# Patient Record
Sex: MALE | Race: WHITE | NOT HISPANIC OR LATINO | Employment: FULL TIME | URBAN - NONMETROPOLITAN AREA
[De-identification: names, ages, dates, MRNs, and addresses within clinical notes are randomized per-mention and may not be internally consistent; named-entity substitution may affect disease eponyms.]

---

## 2023-06-09 PROBLEM — K21.9 GERD (GASTROESOPHAGEAL REFLUX DISEASE): Status: ACTIVE | Noted: 2023-06-09

## 2023-06-09 PROBLEM — I10 HTN (HYPERTENSION): Status: ACTIVE | Noted: 2023-06-09

## 2023-06-09 RX ORDER — AMLODIPINE BESYLATE 5 MG/1
TABLET ORAL
COMMUNITY
Start: 2022-05-28 | End: 2023-06-26 | Stop reason: ALTCHOICE

## 2023-06-09 RX ORDER — HYDROCODONE BITARTRATE AND ACETAMINOPHEN 5; 325 MG/1; MG/1
TABLET ORAL EVERY 8 HOURS
COMMUNITY
Start: 2015-08-26 | End: 2023-06-26 | Stop reason: ALTCHOICE

## 2023-06-09 RX ORDER — IBUPROFEN 800 MG/1
TABLET ORAL 3 TIMES DAILY PRN
COMMUNITY
End: 2023-06-26 | Stop reason: ALTCHOICE

## 2023-06-09 RX ORDER — CETIRIZINE HYDROCHLORIDE 10 MG/1
1 TABLET ORAL DAILY
COMMUNITY
Start: 2021-12-11 | End: 2023-06-26 | Stop reason: ALTCHOICE

## 2023-06-09 RX ORDER — PANTOPRAZOLE SODIUM 40 MG/1
1 TABLET, DELAYED RELEASE ORAL DAILY
COMMUNITY
Start: 2022-05-26 | End: 2023-06-26 | Stop reason: ALTCHOICE

## 2023-06-26 ENCOUNTER — OFFICE VISIT (OUTPATIENT)
Dept: PRIMARY CARE | Facility: CLINIC | Age: 23
End: 2023-06-26
Payer: COMMERCIAL

## 2023-06-26 VITALS
HEART RATE: 75 BPM | HEIGHT: 68 IN | BODY MASS INDEX: 27.58 KG/M2 | WEIGHT: 182 LBS | DIASTOLIC BLOOD PRESSURE: 62 MMHG | OXYGEN SATURATION: 97 % | SYSTOLIC BLOOD PRESSURE: 110 MMHG | TEMPERATURE: 97.3 F

## 2023-06-26 DIAGNOSIS — L72.3 SEBACEOUS CYST: ICD-10-CM

## 2023-06-26 DIAGNOSIS — Z00.00 ANNUAL PHYSICAL EXAM: Primary | ICD-10-CM

## 2023-06-26 PROCEDURE — 3078F DIAST BP <80 MM HG: CPT | Performed by: INTERNAL MEDICINE

## 2023-06-26 PROCEDURE — 3074F SYST BP LT 130 MM HG: CPT | Performed by: INTERNAL MEDICINE

## 2023-06-26 PROCEDURE — 99213 OFFICE O/P EST LOW 20 MIN: CPT | Performed by: INTERNAL MEDICINE

## 2023-06-26 PROCEDURE — 99395 PREV VISIT EST AGE 18-39: CPT | Performed by: INTERNAL MEDICINE

## 2023-06-26 PROCEDURE — 1036F TOBACCO NON-USER: CPT | Performed by: INTERNAL MEDICINE

## 2023-06-26 ASSESSMENT — ENCOUNTER SYMPTOMS
ABDOMINAL PAIN: 0
SINUS PAIN: 0
ARTHRALGIAS: 0
DIARRHEA: 0
FATIGUE: 0
OCCASIONAL FEELINGS OF UNSTEADINESS: 0
BLOOD IN STOOL: 0
BRUISES/BLEEDS EASILY: 0
FEVER: 0
COUGH: 0
PALPITATIONS: 0
SORE THROAT: 0
WOUND: 1
DIFFICULTY URINATING: 0
DIZZINESS: 0
HEADACHES: 0
WHEEZING: 0
UNEXPECTED WEIGHT CHANGE: 0
DEPRESSION: 0
LOSS OF SENSATION IN FEET: 0

## 2023-06-26 ASSESSMENT — COLUMBIA-SUICIDE SEVERITY RATING SCALE - C-SSRS
6. HAVE YOU EVER DONE ANYTHING, STARTED TO DO ANYTHING, OR PREPARED TO DO ANYTHING TO END YOUR LIFE?: NO
1. IN THE PAST MONTH, HAVE YOU WISHED YOU WERE DEAD OR WISHED YOU COULD GO TO SLEEP AND NOT WAKE UP?: NO
2. HAVE YOU ACTUALLY HAD ANY THOUGHTS OF KILLING YOURSELF?: NO

## 2023-06-26 ASSESSMENT — PATIENT HEALTH QUESTIONNAIRE - PHQ9
2. FEELING DOWN, DEPRESSED OR HOPELESS: NOT AT ALL
1. LITTLE INTEREST OR PLEASURE IN DOING THINGS: NOT AT ALL
SUM OF ALL RESPONSES TO PHQ9 QUESTIONS 1 AND 2: 0

## 2023-06-26 ASSESSMENT — PROMIS GLOBAL HEALTH SCALE
RATE_QUALITY_OF_LIFE: VERY GOOD
RATE_AVERAGE_FATIGUE: MILD
CARRYOUT_SOCIAL_ACTIVITIES: GOOD
EMOTIONAL_PROBLEMS: RARELY
RATE_SOCIAL_SATISFACTION: VERY GOOD
CARRYOUT_PHYSICAL_ACTIVITIES: COMPLETELY
RATE_PHYSICAL_HEALTH: VERY GOOD
RATE_MENTAL_HEALTH: VERY GOOD
RATE_GENERAL_HEALTH: VERY GOOD
RATE_AVERAGE_PAIN: 0

## 2023-06-26 NOTE — PROGRESS NOTES
"Subjective   Patient ID: Nnamdi Arellano is a 23 y.o. male who presents for Annual Exam.    Annual preventive visit  -Vaccinations reviewed and up-to-date  - Needs complete blood work  -Patient non-smoker counseled about alcohol moderation  Counseled about sleep hygiene  Cons about healthy diet exercise and weight loss    Need to proceed with blood work fasting follow-up results closely    Follow-up  -Forehead lump consistent with sebaceous cyst refer patient to Derm pathology excisional removal  -Reflux disease symptoms improved continue with diet controlled not taking any medication  -Hypertension controlled on no medications   physical exam in 1 year we will follow-up lab results as needed             Review of Systems   Constitutional:  Negative for fatigue, fever and unexpected weight change.   HENT:  Negative for congestion, ear discharge, ear pain, mouth sores, sinus pain and sore throat.    Eyes:  Negative for visual disturbance.   Respiratory:  Negative for cough and wheezing.    Cardiovascular:  Negative for chest pain, palpitations and leg swelling.   Gastrointestinal:  Negative for abdominal pain, blood in stool and diarrhea.   Genitourinary:  Negative for difficulty urinating.   Musculoskeletal:  Negative for arthralgias.   Skin:  Positive for wound. Negative for rash.   Neurological:  Negative for dizziness and headaches.   Hematological:  Does not bruise/bleed easily.   Psychiatric/Behavioral:  Negative for behavioral problems.    All other systems reviewed and are negative.      Objective   No results found for: \"HGBA1C\"   /62   Pulse 75   Temp 36.3 °C (97.3 °F)   Ht 1.715 m (5' 7.5\")   Wt 82.6 kg (182 lb)   SpO2 97%   BMI 28.08 kg/m²     Physical Exam  Vitals and nursing note reviewed.   Constitutional:       Appearance: Normal appearance.   HENT:      Head: Normocephalic.      Nose: Nose normal.   Eyes:      Conjunctiva/sclera: Conjunctivae normal.      Pupils: Pupils are equal, round, and " reactive to light.   Cardiovascular:      Rate and Rhythm: Regular rhythm.   Pulmonary:      Effort: Pulmonary effort is normal.      Breath sounds: Normal breath sounds.   Abdominal:      General: Abdomen is flat.      Palpations: Abdomen is soft.   Musculoskeletal:      Cervical back: Neck supple.   Skin:     General: Skin is warm.      Findings: Lesion (Forehead sebaceous cyst) present.   Neurological:      General: No focal deficit present.      Mental Status: He is oriented to person, place, and time.   Psychiatric:         Mood and Affect: Mood normal.         Assessment/Plan   Nnamdi was seen today for annual exam.  Diagnoses and all orders for this visit:  Annual physical exam (Primary)  -     CBC and Auto Differential; Future  -     Comprehensive Metabolic Panel; Future  -     Lipid Panel; Future  -     TSH with reflex to Free T4 if abnormal; Future  Sebaceous cyst  -     Referral to Dermatology; Future  Other orders  -     Follow Up In Primary Care; Future   Annual preventive visit  -Vaccinations reviewed and up-to-date  - Needs complete blood work  -Patient non-smoker counseled about alcohol moderation  Counseled about sleep hygiene  Cons about healthy diet exercise and weight loss    Need to proceed with blood work fasting follow-up results closely    Follow-up  -Forehead lump consistent with sebaceous cyst refer patient to Derm pathology excisional removal  -Reflux disease symptoms improved continue with diet controlled not taking any medication  -Hypertension controlled on no medications   physical exam in 1 year we will follow-up lab results as needed

## 2024-06-26 ENCOUNTER — APPOINTMENT (OUTPATIENT)
Dept: PRIMARY CARE | Facility: CLINIC | Age: 24
End: 2024-06-26
Payer: COMMERCIAL